# Patient Record
(demographics unavailable — no encounter records)

---

## 2024-10-30 NOTE — ASSESSMENT
[FreeTextEntry1] : I had a lengthy discussion with the patient regarding nutrition, exercise, weight loss medications.   Patient will work on the following: -Meet with nutritionist as Nelson Jose -Eliminate snacks -Focus on eating 3 well-balanced meals during the daytime with appropriate portion size -Cooking fresh meals rather than take out/processed/ready-made foods -Incorporating exercise; walk 8-10k steps daily -Given patient has not yet repeated bilateral thyroid ultrasound for reevaluation of thyroid nodules, patient advised to discontinue Zepbound for the time being.  We can evaluate regarding restarting on Zepbound based on ultrasound findings after pt has repeated B/L thyroid ultrasound.   Pt verbalized understanding of the above Pt will call office immediately for any side effects. Pt verbalizes understanding and wishes to proceed with medical therapy. Patient educated to call with questions/concerns. All questions answered.   Bladimir Dugan  [address] [place], [place] Tel: [phone] Fax: [phone]

## 2024-10-30 NOTE — ASSESSMENT
[FreeTextEntry1] : I had a lengthy discussion with the patient regarding nutrition, exercise, weight loss medications.   Patient will work on the following: -Meet with nutritionist as Nelson Jose -Eliminate snacks -Focus on eating 3 well-balanced meals during the daytime with appropriate portion size -Cooking fresh meals rather than take out/processed/ready-made foods -Incorporating exercise; walk 8-10k steps daily -Given patient has not yet repeated bilateral thyroid ultrasound for reevaluation of thyroid nodules, patient advised to discontinue Zepbound for the time being.  We can evaluate regarding restarting on Zepbound based on ultrasound findings after pt has repeated B/L thyroid ultrasound.   Pt verbalized understanding of the above Pt will call office immediately for any side effects. Pt verbalizes understanding and wishes to proceed with medical therapy. Patient educated to call with questions/concerns. All questions answered.   Bladimir Dugan  [address] [place], [place] Tel: [phone] Fax: [phone]

## 2024-10-30 NOTE — HISTORY OF PRESENT ILLNESS
[Home] : at home, [unfilled] , at the time of the visit. [Medical Office: ([facility])___] : at the medical office located in  [Verbal consent obtained from patient] : the patient, [unfilled] [FreeTextEntry1] : Jessica is a 51 year old female on Zepbound 2.5 mg for medical weight loss management present today for follow-up.  Patient reports she has tolerated Zepbound 2.5 mg well, denies any significant side effects including but not limited to constipation, nausea, bloating.  Patient's weight at initial consultation was 172 pounds, patient reports her current weight is still 172 pounds. Patient has thyroid nodules, which was determined to be benign after FNA, and was advised during our initial consultation to repeat bilateral thyroid ultrasound for reevaluation.  Patient today reports because she has not seen her PCP yet, therefore she has not had bilateral thyroid ultrasound.  Patient also reports because she was not able to connect with nutritionist via telehealth previously, she needs to make another appointment with a nutritionist.  Weight Loss Medication Screening: Patient denies history of anxiety, substance abuse, uncontrolled blood pressure, cardiac illnesses, pancreatitis. Patient denies family or personal history thyroid cancer or MEN 2 syndrome. Obesity comorbidities: Prediabetes, dyslipidemia.   Heavist weight prior to previous use of GLP-1 agonist: 187 pounds Weight at initial consultation: 172 pounds Today's weight: 172 pounds

## 2024-10-30 NOTE — HISTORY OF PRESENT ILLNESS
[Home] : at home, [unfilled] , at the time of the visit. [Medical Office: ([facility])___] : at the medical office located in  [Verbal consent obtained from patient] : the patient, [unfilled] [FreeTextEntry1] : Jessica is a 51 year old female on Zepbound 2.5 mg for medical weight loss management present today for follow-up.  Patient reports she has tolerated Zepbound 2.5 mg well, denies any significant side effects including but not limited to constipation, nausea, bloating.  Patient's weight at initial consultation was 172 pounds, patient reports her current weight is still 172 pounds. Patient has thyroid nodules, which was determined to be benign after FNA, and was advised during our initial consultation to repeat bilateral thyroid ultrasound for reevaluation.  Patient today reports because she has not seen her PCP yet, therefore she has not had bilateral thyroid ultrasound.  Patient also reports because she was not able to connect with nutritionist via telehealth previously, she needs to make another appointment with a nutritionist.  Weight Loss Medication Screening: Patient denies history of anxiety, substance abuse, uncontrolled blood pressure, cardiac illnesses, pancreatitis. Patient denies family or personal history thyroid cancer or MEN 2 syndrome. Obesity comorbidities: Prediabetes, dyslipidemia.   Heavist weight prior to previous use of GLP-1 agonist: 187 pounds Weight at initial consultation: 172 pounds Today's weight: 172 pounds

## 2024-11-05 NOTE — PHYSICAL EXAM
[No Acute Distress] : no acute distress [Well Nourished] : well nourished [Well Developed] : well developed [Well-Appearing] : well-appearing [No JVD] : no jugular venous distention [No Lymphadenopathy] : no lymphadenopathy [Supple] : supple [Thyroid Normal, No Nodules] : the thyroid was normal and there were no nodules present [No Respiratory Distress] : no respiratory distress  [No Accessory Muscle Use] : no accessory muscle use [Clear to Auscultation] : lungs were clear to auscultation bilaterally [Normal Rate] : normal rate  [Regular Rhythm] : with a regular rhythm [Normal S1, S2] : normal S1 and S2 [No Murmur] : no murmur heard [Soft] : abdomen soft [Non Tender] : non-tender [Non-distended] : non-distended [No Masses] : no abdominal mass palpated [No HSM] : no HSM [Normal Bowel Sounds] : normal bowel sounds [Normal] : normal gait, coordination grossly intact, no focal deficits and deep tendon reflexes were 2+ and symmetric

## 2024-11-05 NOTE — PAST MEDICAL HISTORY
[Menarche Age ____] : age at menarche was [unfilled] [Irregular Cycle Intervals] : are  irregular [Total Preg ___] : G[unfilled] [Live Births ___] : P[unfilled]  [Full Term ___] : Full Term: [unfilled] [Living ___] : Living: [unfilled]

## 2024-11-07 NOTE — HISTORY OF PRESENT ILLNESS
[FreeTextEntry1] : medication refills. [de-identified] : 53 y/o female with PMHx significant for chronic back pain, insomnia, HLD, Thyroid nodule s/p normal biopsy, uncomplicated Covid infection 4/2020, presenting for medication refills.

## 2024-11-07 NOTE — HEALTH RISK ASSESSMENT
[Yes] : Yes [2 - 4 times a month (2 pts)] : 2-4 times a month (2 points) [1 or 2 (0 pts)] : 1 or 2 (0 points) [No] : In the past 12 months have you used drugs other than those required for medical reasons? No [No falls in past year] : Patient reported no falls in the past year [0] : 2) Feeling down, depressed, or hopeless: Not at all (0) [With Patient/Caregiver] : , with patient/caregiver [Reviewed no changes] : Reviewed, no changes [Aggressive treatment] : aggressive treatment [Never] : Never [de-identified] : white wine [Audit-CScore] : 2 [de-identified] : none [de-identified] : regular [[id number]] : 0 [AdvancecareDate] : 11/23

## 2024-11-07 NOTE — HEALTH RISK ASSESSMENT
[Yes] : Yes [2 - 4 times a month (2 pts)] : 2-4 times a month (2 points) [1 or 2 (0 pts)] : 1 or 2 (0 points) [No] : In the past 12 months have you used drugs other than those required for medical reasons? No [No falls in past year] : Patient reported no falls in the past year [0] : 2) Feeling down, depressed, or hopeless: Not at all (0) [With Patient/Caregiver] : , with patient/caregiver [Reviewed no changes] : Reviewed, no changes [Aggressive treatment] : aggressive treatment [Never] : Never [de-identified] : white wine [Audit-CScore] : 2 [de-identified] : none [de-identified] : regular [[id number]] : 0 [AdvancecareDate] : 11/23

## 2024-11-07 NOTE — ASSESSMENT
[FreeTextEntry1] : 53 y/o female with PMHx significant for chronic back pain, insomnia, HLD, Thyroid nodule s/p normal biopsy, presenting for medication refills  CVS: h/o HLD -Diet and exercise discussed. -Lipid profile showed elevated LDL, total Cholesterol and trigs -Restart Atorvastatin 20 mg daily  ENDO: h/o benign thyroid nodule, fam h/o T2DM -Stable. -A1c POCT 5.9 -->5.8 --> 5.7 -Continue managing with diet and exercise, no need for medications at this time  F/E/N: Overweight -On Zepbound by Nutritionist  HCM: -Last PAP Dr Francis 12/22, appointment coming up -Mammogram 05/24 BIrads 2 -Flu vaccine declined -Covid vaccine completed 12/26/20, 1/23/21, 1/26/22 -QuantiFERON plus negative -Colonoscopy reportedly 2 yr ago with Dr Braswell in [place] [phone]

## 2024-11-07 NOTE — ASSESSMENT
[FreeTextEntry1] : 53 y/o female with PMHx significant for chronic back pain, insomnia, HLD, Thyroid nodule s/p normal biopsy, presenting for medication refills  CVS: h/o HLD -Diet and exercise discussed. -Lipid profile showed elevated LDL, total Cholesterol and trigs -Restart Atorvastatin 20 mg daily  ENDO: h/o benign thyroid nodule, fam h/o T2DM -Stable. -A1c POCT 5.9 -->5.8 --> 5.7 -Continue managing with diet and exercise, no need for medications at this time  F/E/N: Overweight -On Zepbound by Nutritionist  HCM: -Last PAP Dr Francis 12/22, appointment coming up -Mammogram 05/24 BIrads 2 -Flu vaccine declined -Covid vaccine completed 12/26/20, 1/23/21, 1/26/22 -QuantiFERON plus negative -Colonoscopy reportedly 2 yr ago with Dr Braswell in [place] [phone]

## 2024-11-07 NOTE — HISTORY OF PRESENT ILLNESS
[FreeTextEntry1] : medication refills. [de-identified] : 51 y/o female with PMHx significant for chronic back pain, insomnia, HLD, Thyroid nodule s/p normal biopsy, uncomplicated Covid infection 4/2020, presenting for medication refills.

## 2025-04-22 NOTE — COUNSELING
[Fall prevention counseling provided] : Fall prevention counseling provided [Behavioral health counseling provided] : Behavioral health counseling provided [Sleep ___ hours/day] : Sleep [unfilled] hours/day [Engage in a relaxing activity] : Engage in a relaxing activity [Yes] : Risk of tobacco use and health benefits of smoking cessation discussed: Yes [AUDIT-C Screening administered and reviewed] : AUDIT-C Screening administered and reviewed [Encouraged to increase physical activity] : Encouraged to increase physical activity [____ min/wk Activity] : [unfilled] min/wk activity [None] : None [Good understanding] : Patient has a good understanding of lifestyle changes and steps needed to achieve self management goal

## 2025-04-23 NOTE — PHYSICAL EXAM
[No Acute Distress] : no acute distress [Well Nourished] : well nourished [Well Developed] : well developed [Well-Appearing] : well-appearing [No JVD] : no jugular venous distention [No Lymphadenopathy] : no lymphadenopathy [Supple] : supple [Thyroid Normal, No Nodules] : the thyroid was normal and there were no nodules present [No Respiratory Distress] : no respiratory distress  [No Accessory Muscle Use] : no accessory muscle use [Clear to Auscultation] : lungs were clear to auscultation bilaterally [Normal Rate] : normal rate  [Regular Rhythm] : with a regular rhythm [Normal S1, S2] : normal S1 and S2 [No Murmur] : no murmur heard [Soft] : abdomen soft [Non Tender] : non-tender [Non-distended] : non-distended [No Masses] : no abdominal mass palpated [No HSM] : no HSM [Normal Bowel Sounds] : normal bowel sounds [Normal] : affect was normal and insight and judgment were intact

## 2025-04-23 NOTE — ASSESSMENT
[Vaccines Reviewed] : Immunizations reviewed today. Please see immunization details in the vaccine log within the immunization flowsheet.  [FreeTextEntry1] : 51 y/o female with PMHx significant for chronic back pain, insomnia, HLD, Thyroid nodule s/p normal biopsy, presenting for CPE  CVS: h/o HLD -Diet and exercise discussed. -Lipid profile showed elevated LDL, total Cholesterol and trigs -Currently on Atorvastatin 20 mg daily  ENDO: h/o benign thyroid nodule, fam h/o T2DM -Stable. -A1c 5.9 -->5.8 --> 5.7 -Continue managing with diet and exercise, no need for medications at this time -Repeat Thyroid US in July  F/E/N: Overweight -No longer on Zepbounfd as she is not obese nor diabetic -Recommend going back to Nutritionist  HCM: -Fasting blood work as outpt -Last PAP Dr Francis 12/22, appointment coming up 5/6/25 -Mammogram 05/24 BIrads 2 -Flu vaccine declined -Covid vaccine completed 12/26/20, 1/23/21, 1/26/22 -QuantiFERON plus negative -Colonoscopy reportedly 2 yr ago with Dr Braswell in [place] [phone]

## 2025-04-23 NOTE — HEALTH RISK ASSESSMENT
[Good] : ~his/her~ current health as good [Fair] :  ~his/her~ mood as fair [Yes] : Yes [2 - 4 times a month (2 pts)] : 2-4 times a month (2 points) [1 or 2 (0 pts)] : 1 or 2 (0 points) [No] : In the past 12 months have you used drugs other than those required for medical reasons? No [No falls in past year] : Patient reported no falls in the past year [0] : 2) Feeling down, depressed, or hopeless: Not at all (0) [Never] : Never [Patient reported mammogram was normal] : Patient reported mammogram was normal [Hepatitis C test declined] : Hepatitis C test declined [None] : None [With Family] : lives with family [# of Members in Household ___] :  household currently consist of [unfilled] member(s) [Employed] : employed [College] : College [] :  [# Of Children ___] : has [unfilled] children [Sexually Active] : sexually active [Feels Safe at Home] : Feels safe at home [Fully functional (bathing, dressing, toileting, transferring, walking, feeding)] : Fully functional (bathing, dressing, toileting, transferring, walking, feeding) [Fully functional (using the telephone, shopping, preparing meals, housekeeping, doing laundry, using] : Fully functional and needs no help or supervision to perform IADLs (using the telephone, shopping, preparing meals, housekeeping, doing laundry, using transportation, managing medications and managing finances) [Reports changes in vision] : Reports changes in vision [Smoke Detector] : smoke detector [Carbon Monoxide Detector] : carbon monoxide detector [Seat Belt] :  uses seat belt [With Patient/Caregiver] : , with patient/caregiver [Reviewed no changes] : Reviewed, no changes [Aggressive treatment] : aggressive treatment [MammogramComments] : s/p US [FreeTextEntry1] : tiredness [de-identified] : white wine [Audit-CScore] : 2 [de-identified] : none [de-identified] : regular [[id number]] : 0 [NO] : No [Change in mental status noted] : No change in mental status noted [Language] : denies difficulty with language [Behavior] : denies difficulty with behavior [Learning/Retaining New Information] : denies difficulty learning/retaining new information [Handling Complex Tasks] : denies difficulty handling complex tasks [Reasoning] : denies difficulty with reasoning [Spatial Ability and Orientation] : denies difficulty with spatial ability and orientation [High Risk Behavior] : no high risk behavior [Reports changes in hearing] : Reports no changes in hearing [Reports changes in dental health] : Reports no changes in dental health [Sunscreen] : does not use sunscreen [TB Exposure] : is not being exposed to tuberculosis [MammogramDate] : 05/24 [PapSmearDate] : 12/22 [PapSmearComments] : Dr Francis [HIVDate] : 05/18 [de-identified] : 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 [AdvancecareDate] : 04/25

## 2025-04-23 NOTE — ASSESSMENT
[Vaccines Reviewed] : Immunizations reviewed today. Please see immunization details in the vaccine log within the immunization flowsheet.  [FreeTextEntry1] : 51 y/o female with PMHx significant for chronic back pain, insomnia, HLD, Thyroid nodule s/p normal biopsy, presenting for CPE  CVS: h/o HLD -Diet and exercise discussed. -Lipid profile showed elevated LDL, total Cholesterol and trigs -Currently on Atorvastatin 20 mg daily  ENDO: h/o benign thyroid nodule, fam h/o T2DM -Stable. -A1c 5.9 -->5.8 --> 5.7 -Continue managing with diet and exercise, no need for medications at this time -Repeat Thyroid US in July  F/E/N: Overweight -No longer on Zepbounfd as she is not obese nor diabetic -Recommend going back to Nutritionist  HCM: -Fasting blood work as outpt -Last PAP Dr Francis 12/22, appointment coming up 5/6/25 -Mammogram 05/24 BIrads 2 -Flu vaccine declined -Covid vaccine completed 12/26/20, 1/23/21, 1/26/22 -QuantiFERON plus negative -Colonoscopy reportedly 2 yr ago with Dr Braswell in [place] [phone]

## 2025-04-23 NOTE — HEALTH RISK ASSESSMENT
[Good] : ~his/her~ current health as good [Fair] :  ~his/her~ mood as fair [Yes] : Yes [2 - 4 times a month (2 pts)] : 2-4 times a month (2 points) [1 or 2 (0 pts)] : 1 or 2 (0 points) [No] : In the past 12 months have you used drugs other than those required for medical reasons? No [No falls in past year] : Patient reported no falls in the past year [0] : 2) Feeling down, depressed, or hopeless: Not at all (0) [Never] : Never [Patient reported mammogram was normal] : Patient reported mammogram was normal [Hepatitis C test declined] : Hepatitis C test declined [None] : None [With Family] : lives with family [# of Members in Household ___] :  household currently consist of [unfilled] member(s) [Employed] : employed [College] : College [] :  [# Of Children ___] : has [unfilled] children [Sexually Active] : sexually active [Feels Safe at Home] : Feels safe at home [Fully functional (bathing, dressing, toileting, transferring, walking, feeding)] : Fully functional (bathing, dressing, toileting, transferring, walking, feeding) [Fully functional (using the telephone, shopping, preparing meals, housekeeping, doing laundry, using] : Fully functional and needs no help or supervision to perform IADLs (using the telephone, shopping, preparing meals, housekeeping, doing laundry, using transportation, managing medications and managing finances) [Reports changes in vision] : Reports changes in vision [Smoke Detector] : smoke detector [Carbon Monoxide Detector] : carbon monoxide detector [Seat Belt] :  uses seat belt [With Patient/Caregiver] : , with patient/caregiver [Reviewed no changes] : Reviewed, no changes [Aggressive treatment] : aggressive treatment [MammogramComments] : s/p US [FreeTextEntry1] : tiredness [de-identified] : white wine [Audit-CScore] : 2 [de-identified] : none [de-identified] : regular [[id number]] : 0 [NO] : No [Change in mental status noted] : No change in mental status noted [Language] : denies difficulty with language [Behavior] : denies difficulty with behavior [Learning/Retaining New Information] : denies difficulty learning/retaining new information [Handling Complex Tasks] : denies difficulty handling complex tasks [Reasoning] : denies difficulty with reasoning [Spatial Ability and Orientation] : denies difficulty with spatial ability and orientation [High Risk Behavior] : no high risk behavior [Reports changes in hearing] : Reports no changes in hearing [Reports changes in dental health] : Reports no changes in dental health [Sunscreen] : does not use sunscreen [TB Exposure] : is not being exposed to tuberculosis [MammogramDate] : 05/24 [PapSmearDate] : 12/22 [PapSmearComments] : Dr Francis [HIVDate] : 05/18 [de-identified] : 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 [AdvancecareDate] : 04/25

## 2025-04-23 NOTE — HISTORY OF PRESENT ILLNESS
[FreeTextEntry1] : CPE [de-identified] : 51 y/o female with PMHx significant for chronic back pain, insomnia, HLD, Thyroid nodule s/p normal biopsy, uncomplicated Covid infection 4/2020, presenting for CPE, no new complains

## 2025-04-23 NOTE — HISTORY OF PRESENT ILLNESS
[FreeTextEntry1] : CPE [de-identified] : 51 y/o female with PMHx significant for chronic back pain, insomnia, HLD, Thyroid nodule s/p normal biopsy, uncomplicated Covid infection 4/2020, presenting for CPE, no new complains

## 2025-04-28 NOTE — PHYSICAL EXAM
[de-identified] : Well-appearing 52-year-old female.  Physical examination limited due to telehealth.

## 2025-04-28 NOTE — HISTORY OF PRESENT ILLNESS
[Home] : at home, [unfilled] , at the time of the visit. [Medical Office: ([facility])___] : at the medical office located in  [Verbal consent obtained from patient] : the patient, [unfilled] [FreeTextEntry1] : Jessica is a 51 year old female Here for follow-up for medical weight loss.Patient reports she had followed up with PCP regarding thyroid nodule the left side, ultrasound-guided FNA demonstrated cyst, and was informed by her PCP that will follow-up with another thyroid ultrasound in 6 months.  Patient denies history of symptoms including but not limited to dysphagia, change of voice.  Weight Loss Medication Screening: Patient denies history of anxiety, substance abuse, uncontrolled blood pressure, cardiac illnesses, pancreatitis. Patient denies family or personal history thyroid cancer or MEN 2 syndrome. Obesity comorbidities: Prediabetes, dyslipidemia.   Current weight 172 pounds (BMI 28.62)

## 2025-04-28 NOTE — ASSESSMENT
[FreeTextEntry1] : I had a lengthy discussion with the patient regarding nutrition, exercise, weight loss medications. Patient qualifies for and interested in weight loss medications.   I emphasized the importance of making healthier food choices including fresh fruits and vegetables, lean meats, and protein sources. I recommended front loading calories, incorporating whole grains, and eliminating fast foods. I also discussed the importance of avoiding fried/fatty foods and foods containing high sugar content including juices/shakes/sodas/desserts.   I also encouraged beginning an exercise program and recommended cardiovascular exercises along with strength training to build lean muscle. I made suggestions on different types of exercises to try.   Patient will work on the following: -Meet with nutritionist -Eliminate snacks -Focus on eating 3 well-balanced meals during the daytime with appropriate portion size -Cooking fresh meals rather than take out/processed/ready-made foods -Incorporating exercise; walk 8-10k steps daily      I have discussed initiating Zepbound therapy for pt. All risks and benefits have been discussed with the patient.   Currently there are no contraindications for the use of Zepbound after reviewing pts medical history and labs including personal or family history of thyroid cancer or MEN2. Possible side effects were discussed with the patient including nausea, reflux, constipation, delayed gastric emptying, or pancreatitis.  Patient advised if experience symptoms including but not limited to dysphagia, change in voice, throat pain, stop Zepbound and contact our office right away.      Discussed with the patient of the warnings of pregnancy/breastfeeding while on zepbound - instructed pt to avoid planned pregnancy/breastfeeding and use of contraception - advised pt to stop immediately if becomes pregnant Pt verbalized understanding of the above.      Pt verbalizes understanding and wishes to proceed with medical therapy. Start Zepbound 2.5 mg based on authorization and tolerance. Patient educated to call with questions/concerns. All questions answered.   Patient is advised to call after 3rd dose for possible dose escalation and make 2 month follow up appointment.    Patient verbalized understanding of all discussion today.   [address] [place] [address] Tel: [phone] Fax: [phone].

## 2025-04-28 NOTE — PHYSICAL EXAM
[de-identified] : Well-appearing 52-year-old female.  Physical examination limited due to telehealth.

## 2025-04-28 NOTE — HISTORY OF PRESENT ILLNESS
[Home] : at home, [unfilled] , at the time of the visit. [Medical Office: ([facility])___] : at the medical office located in  [Verbal consent obtained from patient] : the patient, [unfilled] [FreeTextEntry1] : Jessica is a 51 year old female Here for follow-up for medical weight loss.Patient reports she had followed up with PCP regarding thyroid nodule the left side, ultrasound-guided FNA demonstrated cyst, and was informed by her PCP that will follow-up with another thyroid ultrasound in 6 months.  Patient denies history of symptoms including but not limited to dysphagia, change of voice.  Weight Loss Medication Screening: Patient denies history of anxiety, substance abuse, uncontrolled blood pressure, cardiac illnesses, pancreatitis. Patient denies family or personal history thyroid cancer or MEN 2 syndrome. Obesity comorbidities: Prediabetes, dyslipidemia.   Current weight 172 pounds (BMI 28.62)

## 2025-04-28 NOTE — ASSESSMENT
[FreeTextEntry1] : I had a lengthy discussion with the patient regarding nutrition, exercise, weight loss medications. Patient qualifies for and interested in weight loss medications.   I emphasized the importance of making healthier food choices including fresh fruits and vegetables, lean meats, and protein sources. I recommended front loading calories, incorporating whole grains, and eliminating fast foods. I also discussed the importance of avoiding fried/fatty foods and foods containing high sugar content including juices/shakes/sodas/desserts.   I also encouraged beginning an exercise program and recommended cardiovascular exercises along with strength training to build lean muscle. I made suggestions on different types of exercises to try.   Patient will work on the following: -Meet with nutritionist -Eliminate snacks -Focus on eating 3 well-balanced meals during the daytime with appropriate portion size -Cooking fresh meals rather than take out/processed/ready-made foods -Incorporating exercise; walk 8-10k steps daily      I have discussed initiating Zepbound therapy for pt. All risks and benefits have been discussed with the patient.   Currently there are no contraindications for the use of Zepbound after reviewing pts medical history and labs including personal or family history of thyroid cancer or MEN2. Possible side effects were discussed with the patient including nausea, reflux, constipation, delayed gastric emptying, or pancreatitis.  Patient advised if experience symptoms including but not limited to dysphagia, change in voice, throat pain, stop Zepbound and contact our office right away.      Discussed with the patient of the warnings of pregnancy/breastfeeding while on zepbound - instructed pt to avoid planned pregnancy/breastfeeding and use of contraception - advised pt to stop immediately if becomes pregnant Pt verbalized understanding of the above.      Pt verbalizes understanding and wishes to proceed with medical therapy. Start Zepbound 2.5 mg based on authorization and tolerance. Patient educated to call with questions/concerns. All questions answered.   Patient is advised to call after 3rd dose for possible dose escalation and make 2 month follow up appointment.    Patient verbalized understanding of all discussion today.   [address] [place] [address] Tel: [phone] Fax: [phone].

## 2025-06-19 NOTE — HISTORY OF PRESENT ILLNESS
[Home] : at home, [unfilled] , at the time of the visit. [Medical Office: ([facility])___] : at the medical office located in  [Telehealth (audio & video)] : This visit was provided via telehealth using real-time 2-way audio visual technology. [Verbal consent obtained from patient] : the patient, [unfilled] [FreeTextEntry1] : Jessica is a 52 year old female on Zepbound 5 mg here for follow-up for medical weight loss.  Weight Loss Medication Screening: Patient denies history of anxiety, substance abuse, uncontrolled blood pressure, cardiac illnesses, pancreatitis. Patient denies family or personal history thyroid cancer or MEN 2 syndrome. Obesity comorbidities: Prediabetes, dyslipidemia.   Current weight 172 pounds (BMI 28.62) Current wt:

## 2025-07-09 NOTE — HISTORY OF PRESENT ILLNESS
[FreeTextEntry1] : Jessica is a 52 year old female on Zepbound 5 mg here for follow-up for medical weight loss.  Patient reports she has 3 more doses of Zepbound 5 mg remaining, been tolerating this dose well, denies any side effects including but not limited to abdominal pain, nausea, constipation, bloating.  Patient states she has been adhering to healthy diet and regular physical exercises.  Weight Loss Medication Screening: Patient denies history of anxiety, substance abuse, uncontrolled blood pressure, cardiac illnesses, pancreatitis. Patient denies family or personal history thyroid cancer or MEN 2 syndrome. Obesity comorbidities: Prediabetes, dyslipidemia.   Start wt:  172 pounds (BMI 28.62) Current wt: 162 pounds (BMI 26.96)

## 2025-07-09 NOTE — PHYSICAL EXAM
[de-identified] : Well-appearing 52-year-old female.  Physical examination limited due to telehealth.

## 2025-07-09 NOTE — ASSESSMENT
[FreeTextEntry1] : I had a lengthy discussion with the patient regarding nutrition, exercise, weight loss medications.    I emphasized the importance of making healthier food choices including fresh fruits and vegetables, lean meats, and protein sources. I recommended front loading calories, incorporating whole grains, and eliminating fast foods. I also discussed the importance of avoiding fried/fatty foods and foods containing high sugar content including juices/shakes/sodas/desserts.   I also encouraged beginning an exercise program and recommended cardiovascular exercises along with strength training to build lean muscle. I made suggestions on different types of exercises to try.   Patient will work on the following: -Meet with nutritionist -Eliminate snacks -Focus on eating 3 well-balanced meals during the daytime with appropriate portion size -Cooking fresh meals rather than take out/processed/ready-made foods      I have discussed with the patient regarding continuing Zepbound 5 mg considering she has been tolerating this dose well and losing weight, patient verbalized understanding and is agreeable with the plan.  All risks and benefits have been discussed with the patient.   Currently there are no contraindications for the use of zepbound after reviewing pts medical history and labs including personal or family history of thyroid cancer or MEN2. Possible side effects were discussed with the patient including nausea, reflux, constipation, delayed gastric emptying, or pancreatitis.    Discussed with the patient of the warnings of pregnancy/breastfeeding while on zepbound - instructed pt to avoid planned pregnancy/breastfeeding and use of contraception - advised pt to stop immediately if becomes pregnant Pt verbalized understanding of the above.    Pt verbalizes understanding and wishes to proceed with medical therapy. Continue Zepbound 5 mg based on authorization and tolerance. Patient educated to call with questions/concerns. All questions answered.   Patient is advised to call after 3rd dose for possible dose escalation and make 2 month follow up appointment.    Patient verbalized understanding of all discussion today.  Bladimir Dugan  [facility] [place] [address] Tel: [phone] Fax: [phone]

## 2025-07-09 NOTE — ASSESSMENT
[FreeTextEntry1] : I had a lengthy discussion with the patient regarding nutrition, exercise, weight loss medications.    I emphasized the importance of making healthier food choices including fresh fruits and vegetables, lean meats, and protein sources. I recommended front loading calories, incorporating whole grains, and eliminating fast foods. I also discussed the importance of avoiding fried/fatty foods and foods containing high sugar content including juices/shakes/sodas/desserts.   I also encouraged beginning an exercise program and recommended cardiovascular exercises along with strength training to build lean muscle. I made suggestions on different types of exercises to try.   Patient will work on the following: -Meet with nutritionist -Eliminate snacks -Focus on eating 3 well-balanced meals during the daytime with appropriate portion size -Cooking fresh meals rather than take out/processed/ready-made foods      I have discussed with the patient regarding continuing Zepbound 5 mg considering she has been tolerating this dose well and losing weight, patient verbalized understanding and is agreeable with the plan.  All risks and benefits have been discussed with the patient.   Currently there are no contraindications for the use of zepbound after reviewing pts medical history and labs including personal or family history of thyroid cancer or MEN2. Possible side effects were discussed with the patient including nausea, reflux, constipation, delayed gastric emptying, or pancreatitis.    Discussed with the patient of the warnings of pregnancy/breastfeeding while on zepbound - instructed pt to avoid planned pregnancy/breastfeeding and use of contraception - advised pt to stop immediately if becomes pregnant Pt verbalized understanding of the above.    Pt verbalizes understanding and wishes to proceed with medical therapy. Continue Zepbound 5 mg based on authorization and tolerance. Patient educated to call with questions/concerns. All questions answered.   Patient is advised to call after 3rd dose for possible dose escalation and make 2 month follow up appointment.    Patient verbalized understanding of all discussion today.  Bladimir Dugan  [facility] [place] [address] Tel: [phone] Fax: [phone]

## 2025-07-09 NOTE — PHYSICAL EXAM
[de-identified] : Well-appearing 52-year-old female.  Physical examination limited due to telehealth.